# Patient Record
Sex: FEMALE | Race: WHITE | Employment: OTHER | URBAN - NONMETROPOLITAN AREA
[De-identification: names, ages, dates, MRNs, and addresses within clinical notes are randomized per-mention and may not be internally consistent; named-entity substitution may affect disease eponyms.]

---

## 2019-02-14 ENCOUNTER — INITIAL CONSULT (OUTPATIENT)
Dept: PULMONOLOGY | Age: 59
End: 2019-02-14
Payer: OTHER GOVERNMENT

## 2019-02-14 VITALS
OXYGEN SATURATION: 97 % | WEIGHT: 197 LBS | HEART RATE: 73 BPM | SYSTOLIC BLOOD PRESSURE: 124 MMHG | BODY MASS INDEX: 29.86 KG/M2 | HEIGHT: 68 IN | DIASTOLIC BLOOD PRESSURE: 70 MMHG

## 2019-02-14 DIAGNOSIS — G47.10 HYPERSOMNIA: Primary | ICD-10-CM

## 2019-02-14 DIAGNOSIS — I10 ESSENTIAL HYPERTENSION: ICD-10-CM

## 2019-02-14 DIAGNOSIS — F32.A DEPRESSION, UNSPECIFIED DEPRESSION TYPE: ICD-10-CM

## 2019-02-14 PROCEDURE — 99204 OFFICE O/P NEW MOD 45 MIN: CPT | Performed by: INTERNAL MEDICINE

## 2019-02-14 RX ORDER — TRAMADOL HYDROCHLORIDE 50 MG/1
50 TABLET ORAL EVERY 6 HOURS PRN
COMMUNITY
End: 2019-02-14

## 2019-02-14 RX ORDER — FLUTICASONE PROPIONATE 50 MCG
1 SPRAY, SUSPENSION (ML) NASAL DAILY
COMMUNITY

## 2019-02-14 RX ORDER — METRONIDAZOLE 10 MG/G
GEL TOPICAL DAILY
COMMUNITY
End: 2019-11-06 | Stop reason: ALTCHOICE

## 2019-02-14 RX ORDER — ACETAMINOPHEN 325 MG/1
650 TABLET ORAL EVERY 6 HOURS PRN
COMMUNITY

## 2019-02-14 RX ORDER — CYCLOSPORINE 0.5 MG/ML
1 EMULSION OPHTHALMIC 2 TIMES DAILY
COMMUNITY

## 2019-02-14 RX ORDER — DULOXETIN HYDROCHLORIDE 30 MG/1
30 CAPSULE, DELAYED RELEASE ORAL DAILY
COMMUNITY
End: 2019-11-06 | Stop reason: ALTCHOICE

## 2019-02-14 RX ORDER — MELATONIN
5000
COMMUNITY

## 2019-02-14 RX ORDER — TRIAMTERENE AND HYDROCHLOROTHIAZIDE 37.5; 25 MG/1; MG/1
1 CAPSULE ORAL EVERY MORNING
COMMUNITY
End: 2019-11-06 | Stop reason: ALTCHOICE

## 2019-02-27 DIAGNOSIS — G47.10 HYPERSOMNIA: Primary | ICD-10-CM

## 2019-03-25 DIAGNOSIS — I10 ESSENTIAL HYPERTENSION: ICD-10-CM

## 2019-03-25 DIAGNOSIS — F32.A DEPRESSION, UNSPECIFIED DEPRESSION TYPE: ICD-10-CM

## 2019-03-25 DIAGNOSIS — G47.10 HYPERSOMNIA: Primary | ICD-10-CM

## 2019-03-26 ENCOUNTER — TELEPHONE (OUTPATIENT)
Dept: PULMONOLOGY | Age: 59
End: 2019-03-26

## 2019-04-25 ENCOUNTER — OFFICE VISIT (OUTPATIENT)
Dept: PULMONOLOGY | Age: 59
End: 2019-04-25
Payer: OTHER GOVERNMENT

## 2019-04-25 VITALS
HEART RATE: 80 BPM | BODY MASS INDEX: 30.31 KG/M2 | HEIGHT: 68 IN | OXYGEN SATURATION: 95 % | RESPIRATION RATE: 16 BRPM | SYSTOLIC BLOOD PRESSURE: 130 MMHG | DIASTOLIC BLOOD PRESSURE: 82 MMHG | WEIGHT: 200 LBS

## 2019-04-25 DIAGNOSIS — Z99.89 OSA ON CPAP: Primary | ICD-10-CM

## 2019-04-25 DIAGNOSIS — G47.10 HYPERSOMNIA: ICD-10-CM

## 2019-04-25 DIAGNOSIS — G47.33 OSA ON CPAP: Primary | ICD-10-CM

## 2019-04-25 PROCEDURE — 99213 OFFICE O/P EST LOW 20 MIN: CPT | Performed by: INTERNAL MEDICINE

## 2019-04-25 NOTE — PROGRESS NOTES
Types: 1 Glasses of wine per week     Comment: occasionally     Drug use: Not on file       No Known Allergies    Current Outpatient Medications   Medication Sig Dispense Refill    Cholecalciferol (VITAMIN D3) 1000 units TABS Take 5,000 Units by mouth       fluticasone (FLONASE) 50 MCG/ACT nasal spray 1 spray by Each Nare route daily      metroNIDAZOLE (METROGEL) 1 % gel Apply topically daily Apply topically daily.  cycloSPORINE (RESTASIS) 0.05 % ophthalmic emulsion 1 drop 2 times daily      triamterene-hydrochlorothiazide (DYAZIDE) 37.5-25 MG per capsule Take 1 capsule by mouth every morning      acetaminophen (TYLENOL) 325 MG tablet Take 650 mg by mouth every 6 hours as needed for Pain      NONFORMULARY       DULoxetine (CYMBALTA) 30 MG extended release capsule Take 30 mg by mouth daily       No current facility-administered medications for this visit. Family History   Problem Relation Age of Onset    Hypertension Mother     Depression Mother     Heart Disease Mother     Cancer Father     Heart Disease Father     Hypertension Father     Anxiety Disorder Sister     Depression Sister     Cancer Brother     Alcohol Abuse Maternal Grandfather     Cancer Paternal Grandmother           /82 (Site: Left Upper Arm, Position: Sitting, Cuff Size: Medium Adult)   Pulse 80   Resp 16   Ht 5' 7.5\" (1.715 m)   Wt 200 lb (90.7 kg)   SpO2 95% Comment: on RA  BMI 30.86 kg/m²     BMI:  Body mass index is 30.86 kg/m². Mallampati airway Class:  IV  Neck Circumference:  14.75 Inches  [place] sleepiness score 4/25/19:  17. SAQLI: 42       Physical Exam :  Constitutional: Patient appears moderately built and moderately nourished. No distress. Patient is oriented to person, place, and time. HENT:   Head: Normocephalic and atraumatic.    Right Ear: External ear normal.   Left Ear: External ear normal.   Mouth/Throat: Oropharynx is clear and moist.   Eyes: Conjunctivae are normal. Pupils are equal and reactive to light. No scleral icterus. Neck: Neck supple. No JVD present. Cardiovascular: Normal rate, regular rhythm, normal heart sounds. No murmur heard. Pulmonary/Chest: Effort normal and breath sounds normal. No stridor. No respiratory distress. No wheezes. No rales. Abdominal: Soft. Patient exhibits no distension. No tenderness. Musculoskeletal: Normal range of motion. Extremities: Patient exhibits no erythema or no edema. Lymphadenopathy:  No cervical adenopathy. Neurological: Patient is alert and oriented to person, place, and time. Skin: Skin is warm and dry. Patient is not diaphoretic. Psychiatric: Patient  has a normal mood and affect. Diagnostic Data:  Home sleep study done on :  4/10/19. AHI: 12.9.       Diagnostic Data: 4-10-19 AHI 12.9    Assesment:  -Mild obstructive sleep apnea-Newly diagnosed. -She denied any prior history of COPD, CHF, Obesity hypoventilation, Prior palate surgery and Central sleep apnea. -Hypersomnia ( Excessive daytime sleepiness) due to obstructive sleep apnea. -Allergic rhinitis on treatment with Flonase- under control.  -Hx of Deviated nasal septum. S/p Surgery in 2013.  -S/p Tonsillectomy at the age of 3years.  -Hx of Malignant melanoma S/p Surgery on her back in 1996. -Depression- she used to be Cymbalta in the past. She quit taking Cymbalta. -Hypertension on meds- under control  -Torn Retina- She is currently waiting surgery for her Left side torn retina tomarrow by her Dr. Gill Moore. She underwent cataract surgery in Feb, 2019 on both sides.       Recommendations/Plan:  -I had a discussion with patient regarding avialable treatment options for her sleep disorder breathing including but not limited to CPAP titration in the sleep lab Vs.Dental appliance placement with referral to a local dentist Vs other available surgical options including Uvulopalatopharyngoplasty, maxillomandibular ostomy and tracheostomy as last option. At the end of discussion, she decided to go for treatment with CPAP therapy. - Schedule patient for CPAP titration at [facility] sleep lab as soon as possible once cleared by her Ophthalmologist Dr. Abhishek Jane. Patient to follow with my clinic at [address] in 6 to 8 weeks with CPAP download for further evaluation.  -she advised to keep good compliance with recommended positive airway pressure therapy to get optimal results and clinical improvement.  -She was advised to call [employer] regarding supplies if needed.  -She was advised to call my office for earlier appointment if needed for worsening of sleep symptoms. Sal Kramer advised to not to drive any motor vehicles or operate heavy equipment until her sleep symptoms are under good control. Sal Kramer verbalizes understanding.  -She was advised to loose weight by controlling diet and doing exercise once cleared by her family physician. -Sal Kramer was educated about my impression and plan. She verbalizes understanding.

## 2019-04-25 NOTE — PATIENT INSTRUCTIONS
Recommendations/Plan:  -I had a discussion with patient regarding avialable treatment options for her sleep disorder breathing including but not limited to CPAP titration in the sleep lab Vs.Dental appliance placement with referral to a local dentist Vs other available surgical options including Uvulopalatopharyngoplasty, maxillomandibular ostomy and tracheostomy as last option. At the end of discussion, she decided to go for treatment with CPAP therapy. - Schedule patient for CPAP titration at [facility] sleep lab as soon as possible once cleared by her Ophthalmologist Dr. Abhishek Jane. Patient to follow with my clinic at [address] in 6 to 8 weeks with CPAP download for further evaluation.  -she advised to keep good compliance with recommended positive airway pressure therapy to get optimal results and clinical improvement.  -She was advised to call [employer] regarding supplies if needed.  -She was advised to call my office for earlier appointment if needed for worsening of sleep symptoms. Sal Kramer advised to not to drive any motor vehicles or operate heavy equipment until her sleep symptoms are under good control. Sal Kramer verbalizes understanding.  -She was advised to loose weight by controlling diet and doing exercise once cleared by her family physician. -Sal Kramer was educated about my impression and plan. She verbalizes understanding.

## 2019-05-08 DIAGNOSIS — G47.33 OSA (OBSTRUCTIVE SLEEP APNEA): Primary | ICD-10-CM

## 2019-05-08 DIAGNOSIS — G47.10 HYPERSOMNIA: ICD-10-CM

## 2019-05-09 ENCOUNTER — TELEPHONE (OUTPATIENT)
Dept: PULMONOLOGY | Age: 59
End: 2019-05-09

## 2019-05-09 NOTE — TELEPHONE ENCOUNTER
Called Pt to find out where she wants CPAP auto, sent to [facility] sleep and they are taking care of this

## 2019-07-24 ENCOUNTER — OFFICE VISIT (OUTPATIENT)
Dept: PULMONOLOGY | Age: 59
End: 2019-07-24
Payer: OTHER GOVERNMENT

## 2019-07-24 VITALS
OXYGEN SATURATION: 94 % | HEART RATE: 66 BPM | DIASTOLIC BLOOD PRESSURE: 88 MMHG | RESPIRATION RATE: 16 BRPM | BODY MASS INDEX: 30.71 KG/M2 | WEIGHT: 202.6 LBS | HEIGHT: 68 IN | SYSTOLIC BLOOD PRESSURE: 126 MMHG

## 2019-07-24 DIAGNOSIS — Z99.89 OSA ON CPAP: Primary | ICD-10-CM

## 2019-07-24 DIAGNOSIS — G47.33 OSA ON CPAP: Primary | ICD-10-CM

## 2019-07-24 PROCEDURE — 99213 OFFICE O/P EST LOW 20 MIN: CPT | Performed by: NURSE PRACTITIONER

## 2019-07-24 NOTE — PROGRESS NOTES
9.6 oz (91.9 kg)   04/25/19 200 lb (90.7 kg)   02/14/19 197 lb (89.4 kg)     Weight stable / unchanged  Vitals: /88 (Site: Right Lower Arm, Position: Sitting, Cuff Size: Medium Adult)   Pulse 66   Resp 16   Ht 5' 7.5\" (1.715 m)   Wt 202 lb 9.6 oz (91.9 kg)   SpO2 94% Comment: on RA  BMI 31.26 kg/m²         General Appearance Moderately built, moderately nourished, in no acute distress. HEENT - Head is normocephalic, atraumatic. PERRL. Oral mucosa pink and moist, no oral thrush. Mallampati Score - IV (only hard palate visible). Neck - Supple, symmetrical, trachea midline and soft. Lungs - Chest symmetric with normal A/P diameter, normal respiratory rate and rhythm, lungs clear to auscultation, no wheezes, rales or rhonchi, aeration good. Cardiovascular - Heart sounds are normal. Regular rhythm normal rate without murmur, gallop or rub. Abdomen - Soft, nontender, non-distended. Neurologic - Alert and oriented x 3. Skin - No bruising or bleeding. Extremities - No cyanosis, clubbing or edema. ASSESSMENT/DIAGNOSIS     Diagnosis Orders   1. JACKIE on CPAP              Plan   Do you need any equipment today? No. Eran APAP to CPAP 12 cwp. She requests no RAMP. To call with problems.    - She  was advised to continue current positive airway pressure therapy with above described pressure. - She  advised to keep goodcompliance with current recommended pressure to get optimal results and clinical improvement.  - Recommend 7-9 hours of sleep with PAP treatment. - She was advised to call [employer] regarding supplies if needed.   -She call my office for earlier appointment if needed for worsening of sleep symptoms.   - She was instructed on weight loss. Rocio Sky was educated about my impression and plan. Patient verbalizes understanding. We will see Frankie Alcantar back in: 3 months with download.       Electronically signed by ABRAHAM Christensen CNP on 7/24/2019 at 9:57 AM

## 2019-08-02 ENCOUNTER — TELEPHONE (OUTPATIENT)
Dept: PULMONOLOGY | Age: 59
End: 2019-08-02

## 2019-11-06 ENCOUNTER — OFFICE VISIT (OUTPATIENT)
Dept: PULMONOLOGY | Age: 59
End: 2019-11-06
Payer: OTHER GOVERNMENT

## 2019-11-06 VITALS
WEIGHT: 201.6 LBS | SYSTOLIC BLOOD PRESSURE: 116 MMHG | HEIGHT: 68 IN | OXYGEN SATURATION: 96 % | HEART RATE: 63 BPM | BODY MASS INDEX: 30.55 KG/M2 | DIASTOLIC BLOOD PRESSURE: 72 MMHG

## 2019-11-06 DIAGNOSIS — Z99.89 OSA ON CPAP: Primary | ICD-10-CM

## 2019-11-06 DIAGNOSIS — R14.0 ABDOMINAL BLOATING: ICD-10-CM

## 2019-11-06 DIAGNOSIS — Z86.79 HISTORY OF ESSENTIAL HYPERTENSION: ICD-10-CM

## 2019-11-06 DIAGNOSIS — G47.33 OSA ON CPAP: Primary | ICD-10-CM

## 2019-11-06 DIAGNOSIS — Z86.59 HISTORY OF DEPRESSION: ICD-10-CM

## 2019-11-06 PROCEDURE — 99214 OFFICE O/P EST MOD 30 MIN: CPT | Performed by: NURSE PRACTITIONER

## 2020-05-06 ENCOUNTER — OFFICE VISIT (OUTPATIENT)
Dept: PULMONOLOGY | Age: 60
End: 2020-05-06
Payer: OTHER GOVERNMENT

## 2020-05-06 VITALS
BODY MASS INDEX: 32.19 KG/M2 | HEIGHT: 68 IN | WEIGHT: 212.4 LBS | TEMPERATURE: 97.7 F | RESPIRATION RATE: 16 BRPM | SYSTOLIC BLOOD PRESSURE: 136 MMHG | DIASTOLIC BLOOD PRESSURE: 76 MMHG | OXYGEN SATURATION: 98 % | HEART RATE: 63 BPM

## 2020-05-06 PROCEDURE — 99213 OFFICE O/P EST LOW 20 MIN: CPT | Performed by: NURSE PRACTITIONER

## 2020-05-06 NOTE — PROGRESS NOTES
[place] [facility] [address]         [id number]  5/6/2020   Chief Complaint   Patient presents with    3 Month Follow-Up     JACKIE 3 mo f/u with download        Pt of Dr. Radha SOLER Download:   Antoinette Velasquez or initial AHI: 12.9   Date of initial study: 3-19-19  Weight of initial study: 198  [x] Compliant  97%   [] Noncompliant 3%     PAP Type Auto set  Level  7/33inZ1O  Avg Hrs/Day 6 hrs 56 min  AHI: 2.5   Recorded compliance dates, 4-1-20 to 4-30-20  Machine/Mfg: ResMed   Interface: FFM    Provider:  [][facility]  []Angel [][employer]  [x][facility]         [][facility] []Other:     Neck Size: 15 in  Mallampati IV  ESS:  7  Here is a scan of the most recent download:          Presentation:   Lucila Burkitt presents for sleep medicine follow up for obstructive sleep apnea. Since the last visit, Lucila Burkitt continues to do well with treatment. Gas issues have resolved with changing back to APAP. Still mask leak issues despite trying multiple styles, chin strap, CPAP pillow, etc.     Equipment issues: The pressure is acceptable, the mask is somewhat acceptable and she is using the humidity. Sleep issues:  Do you feel better? Yes  More rested? Yes   Better concentration? yes    Progress History:   Since last visit any new medical issues? No  New ER or hospitlal visits? No  Any new or changes in medicines? No  Any new sleep medicines? No      Past Medical History:   Diagnosis Date    Cancer ([facility]     Depression     Hypertension     Neuromuscular disorder ([facility]     JACKEI on CPAP     Osteoarthritis        Past Surgical History:   Procedure Laterality Date    CATARACT REMOVAL WITH IMPLANT Bilateral     2-6-and 2-20-19    REPAIR RETINAL TEAR/HOLE         Social History     Tobacco Use    Smoking status: Never Smoker    Smokeless tobacco: Never Used   Substance Use Topics    Alcohol use:  Yes     Alcohol/week: 1.0 standard drinks     Types: 1 Glasses of wine per week     Comment: occasionally    

## 2020-12-16 ENCOUNTER — OFFICE VISIT (OUTPATIENT)
Dept: PULMONOLOGY | Age: 60
End: 2020-12-16
Payer: OTHER GOVERNMENT

## 2020-12-16 VITALS
HEIGHT: 68 IN | DIASTOLIC BLOOD PRESSURE: 78 MMHG | WEIGHT: 197.4 LBS | BODY MASS INDEX: 29.92 KG/M2 | TEMPERATURE: 98.1 F | OXYGEN SATURATION: 98 % | SYSTOLIC BLOOD PRESSURE: 128 MMHG | HEART RATE: 72 BPM

## 2020-12-16 PROBLEM — Z99.89 OSA ON CPAP: Status: ACTIVE | Noted: 2020-12-16

## 2020-12-16 PROBLEM — G47.33 OSA ON CPAP: Status: ACTIVE | Noted: 2020-12-16

## 2020-12-16 PROCEDURE — 99213 OFFICE O/P EST LOW 20 MIN: CPT | Performed by: NURSE PRACTITIONER

## 2020-12-16 NOTE — PROGRESS NOTES
[place] [facility] and [address]         [id number]  12/16/2020   Chief Complaint   Patient presents with    Follow-up     JACKIE 6 month sleep follow up with a [facility] pap download        Pt of Dr. Bernarda Peck    PAP Download:   Original or initial AHI: 12.9   Date of initial study: 03/19/2019  Weight of initial study: 198 lbs  [x] Compliant  93%   [] Noncompliant 7%     PAP Type AutoSet For Her Level  7/13   Avg Hrs/Day 7 hours 17 minutes  AHI: 2.7   Recorded compliance dates, 11/15/2020 to 12/14/2020   Machine/Mfg: Resmed  Interface: FFM    Provider:  [][facility]  [][employer] [][employer]  [x][facility]         [][facility] []Other:     Neck Size: 15  Mallampati Mallampati 4  ESS: 4  SAQLI: 76      Here is a scan of the most recent download:                  Presentation:   Gavi Lee presents for sleep medicine follow up for obstructive sleep apnea. Since the last visit, Gavi Lee continues to do well, no complaints. Equipment issues: The pressure is acceptable, the mask is acceptable and she is using the humidity. Sleep issues:  Do you feel better? Yes  More rested? Yes   Better concentration? yes    Progress History:   Since last visit any new medical issues? No  New ER or hospitlal visits? No  Any new or changes in medicines? No  Any new sleep medicines? No      Past Medical History:   Diagnosis Date    Cancer [facility]     Depression     Hypertension     Neuromuscular disorder ([facility]     JACKIE on CPAP     Osteoarthritis        Past Surgical History:   Procedure Laterality Date    CATARACT REMOVAL WITH IMPLANT Bilateral     2-6-and 2-20-19    REPAIR RETINAL TEAR/HOLE         Social History     Tobacco Use    Smoking status: Never Smoker    Smokeless tobacco: Never Used   Substance Use Topics    Alcohol use:  Yes     Alcohol/week: 1.0 standard drinks     Types: 1 Glasses of wine per week     Comment: occasionally     Drug use: Not on file       No Known Allergies Current Outpatient Medications   Medication Sig Dispense Refill    ZINC PO Take by mouth      Omega 3-6-9 Fatty Acids (OMEGA-3-6-9 PO) Take by mouth      CPAP Machine MISC by Does not apply route Please change CPAP to APAP pressure of 7 to 13 cm H20. 1 each 0    Cholecalciferol (VITAMIN D3) 1000 units TABS Take 5,000 Units by mouth       fluticasone (FLONASE) 50 MCG/ACT nasal spray 1 spray by Each Nare route daily      cycloSPORINE (RESTASIS) 0.05 % ophthalmic emulsion 1 drop 2 times daily      acetaminophen (TYLENOL) 325 MG tablet Take 650 mg by mouth every 6 hours as needed for Pain      NONFORMULARY        No current facility-administered medications for this visit. Family History   Problem Relation Age of Onset   [facility] Hypertension Mother     Depression Mother     Heart Disease Mother     Cancer Father     Heart Disease Father     Hypertension Father     Sleep Apnea Father     Anxiety Disorder Sister     Depression Sister     Sleep Apnea Sister     Cancer Brother     Sleep Apnea Brother     Alcohol Abuse Maternal Grandfather     Cancer Paternal Grandmother           Review of Systems   General/Constitutional: No recent loss of weight or appetite changes. No fever or chills. HENT: Negative. Eyes: Negative. Upper respiratory tract: No nasal stuffiness or post nasal drip. Lower respiratory tract/ lungs: No cough or sputum production. No hemoptysis. Cardiovascular: No palpitations or chest pain. Gastrointestinal: No nausea or vomiting. Neurological: No focal neurologiacal weakness. Extremities: No edema. Musculoskeletal: No complaints. Genitourinary: No complaints. Hematological: Negative. Psychiatric/Behavioral: Negative. Skin: No itching. Physical Exam:    BMI: Body mass index is 30.01 kg/m².     Wt Readings from Last 3 Encounters:   12/16/20 197 lb 6.4 oz (89.5 kg)   05/06/20 212 lb 6.4 oz (96.3 kg)   11/06/19 201 lb 9.6 oz (91.4 kg)     Weight stable / unchanged Vitals: /78 (Site: Left Upper Arm, Position: Sitting, Cuff Size: Medium Adult)   Pulse 72   Temp 98.1 °F (36.7 °C)   Ht 5' 8\" (1.727 m)   Wt 197 lb 6.4 oz (89.5 kg)   SpO2 98% Comment: on room air at rest  BMI 30.01 kg/m²         General Appearance - Moderately built, moderately nourished, in no acute distress. HEENT - Head is normocephalic, atraumatic. PERRL. Oral mucosa pink and moist, no oral thrush. Mallampati Score - IV (only hard palate visible). Neck - Supple, symmetrical, trachea midline and soft. Lungs - Clear to auscultation, no wheezes, rales or rhonchi, aeration good. Cardiovascular - Heart sounds are normal. Regular rhythm normal rate without murmur, gallop or rub. Abdomen - Soft, nontender, non-distended. Neurologic - Alert and oriented x 3. Skin - No bruising or bleeding. Extremities - No cyanosis, clubbing or edema. ASSESSMENT/DIAGNOSIS     Diagnosis Orders   1. JACKIE on CPAP              Plan   Do you need any equipment today? No.    - She was advised to continue current positive airway pressure therapy with above described pressure. - She was advised to keep good compliance with current recommended pressure to get optimal results and clinical improvement.  - Recommend 7-9 hours of sleep with PAP treatment. - She was advised to call [email or web address] regarding supplies if needed.   -She is to call my office for earlier appointment if needed for worsening of sleep symptoms.   - She was instructed on weight loss. Esther Mobicious was educated about my impression and plan and verbalizes understanding. We will see Davi Mata back in: 1 year with download.     ABRAHAM Guy - CNP  12/16/2020 9:50 AM

## 2021-12-15 ENCOUNTER — OFFICE VISIT (OUTPATIENT)
Dept: PULMONOLOGY | Age: 61
End: 2021-12-15
Payer: OTHER GOVERNMENT

## 2021-12-15 VITALS
SYSTOLIC BLOOD PRESSURE: 128 MMHG | WEIGHT: 186.4 LBS | BODY MASS INDEX: 28.25 KG/M2 | TEMPERATURE: 96.4 F | HEART RATE: 61 BPM | DIASTOLIC BLOOD PRESSURE: 72 MMHG | HEIGHT: 68 IN | OXYGEN SATURATION: 98 %

## 2021-12-15 DIAGNOSIS — Z99.89 OSA ON CPAP: Primary | ICD-10-CM

## 2021-12-15 DIAGNOSIS — G47.33 OSA ON CPAP: Primary | ICD-10-CM

## 2021-12-15 DIAGNOSIS — G89.29 CHRONIC NECK PAIN: ICD-10-CM

## 2021-12-15 DIAGNOSIS — M54.2 CHRONIC NECK PAIN: ICD-10-CM

## 2021-12-15 PROBLEM — M22.2X2 PATELLOFEMORAL DISORDER OF BOTH KNEES: Status: ACTIVE | Noted: 2020-01-13

## 2021-12-15 PROBLEM — M22.2X1 PATELLOFEMORAL DISORDER OF BOTH KNEES: Status: ACTIVE | Noted: 2020-01-13

## 2021-12-15 PROBLEM — I10 ESSENTIAL HYPERTENSION, BENIGN: Status: ACTIVE | Noted: 2019-06-25

## 2021-12-15 PROCEDURE — 99213 OFFICE O/P EST LOW 20 MIN: CPT | Performed by: NURSE PRACTITIONER

## 2021-12-15 ASSESSMENT — ENCOUNTER SYMPTOMS
WHEEZING: 0
SHORTNESS OF BREATH: 0
VOMITING: 0
CHEST TIGHTNESS: 0
COUGH: 0
NAUSEA: 0
ABDOMINAL PAIN: 0
EYES NEGATIVE: 1
DIARRHEA: 0

## 2021-12-15 NOTE — PROGRESS NOTES
[place] [facility]      Nicholas Hernandez         [id number]  12/15/2021   Chief Complaint   Patient presents with    Follow-up     JACKIE 1 year sleep follow up with Goldie FUENTES         Pt of Dr. Eliza SOLER Download:   Original or initial AHI: 12.9    Date of initial study: 3/19/2019    Compliant  77%     Noncompliant 17 %     PAP Type airsense 10 Level  7/13 cmh2o    Avg Hrs/Day 6:10  AHI: 1.6   Recorded compliance dates 11/10/21-12/9/21   ,Machine/Mfg:   [x] ResMed    [] Respironics/Dreamstation   Interface:   [] Nasal    [x] Nasal pillows   [] FFM      Provider:      [] -RHODA     []Angel     [] Saumya    [x] Vaughn Macias    [] Darius               [] [facility]      [] [employer]    [] [address]      [] Other    Neck Size: 15  Mallampati Mallampati 4  ESS:  6  SAQLI: 93  Here is a scan of the most recent download:            Presentation:   Freda Myers presents for sleep medicine follow up for obstructive sleep apnea  Since the last visit, Freda Myers got new mask within past 30 days and is now tolerating use much better. Mask fits good. Likes auto pressure, no complaints . Chronic neck pain will affect sleeping. Stopped Melatonin due to morning headaches   Using essential oils to help sleep and occ. Tylenol PM   Equipment issues: The pressure is  acceptable, the mask is acceptable     Sleep issues:  Do you feel better? Yes  More rested? Yes   Better concentration? yes    Progress History:   Since last visit any new medical issues? No  New ER or hospital visits? No  Any new or changes in medicines? No  Any new sleep medicines? No    Review of Systems -   Review of Systems   Constitutional: Negative for activity change, appetite change, chills, fatigue, fever and unexpected weight change. HENT: Negative. Eyes: Negative. Respiratory: Negative for cough, chest tightness, shortness of breath and wheezing.     Cardiovascular: Negative for chest pain, palpitations and leg swelling. Gastrointestinal: Negative for abdominal pain, diarrhea, nausea and vomiting. Genitourinary: Negative. Musculoskeletal: Positive for neck pain. Skin: Negative. Neurological: Negative. Hematological: Does not bruise/bleed easily. Psychiatric/Behavioral: Positive for sleep disturbance. Negative for suicidal ideas. Physical Exam:    BMI:  Body mass index is 28.34 kg/m². Wt Readings from Last 3 Encounters:   12/15/21 186 lb 6.4 oz (84.6 kg)   12/16/20 197 lb 6.4 oz (89.5 kg)   05/06/20 212 lb 6.4 oz (96.3 kg)     Weight gained 14 lbs over 1 year   Vitals: /72 (Site: Left Upper Arm, Position: Sitting)   Pulse 61   Temp 96.4 °F (35.8 °C)   Ht 5' 8\" (1.727 m)   Wt 186 lb 6.4 oz (84.6 kg)   SpO2 98% Comment: on ra  BMI 28.34 kg/m²       Physical Exam  Constitutional:       Appearance: Normal appearance. She is normal weight. HENT:      Head: Normocephalic and atraumatic. Eyes:      Conjunctiva/sclera: Conjunctivae normal.   Pulmonary:      Effort: Pulmonary effort is normal. No tachypnea, bradypnea or respiratory distress. Neurological:      Mental Status: She is alert and oriented to person, place, and time. Psychiatric:         Attention and Perception: Attention normal.         Mood and Affect: Mood normal.         Speech: Speech normal.         Behavior: Behavior normal.         Thought Content: Thought content normal.         Cognition and Memory: Cognition normal.         Judgment: Judgment normal.         ASSESSMENT/DIAGNOSIS     Diagnosis Orders   1. JACKIE on CPAP  DME Order for CPAP as OP   2. Chronic neck pain              Plan   Do you need any equipment today? Yes, updated supply order prescription printed  - Download reviewed and discussed with patient  - She  was advised to continue current positive airway pressure therapy with above described pressure.    - She  advised to keep good compliance with current recommended pressure to get optimal results and clinical improvement  - Recommend 7-9 hours of sleep with PAP  - She was advised to call [employer] regarding supplies if needed.   -She call my office for earlier appointment if needed for worsening of sleep symptoms.   - She was instructed on weight loss  - Will Freed was educated about my impression and plan. Patient verbalizesunderstanding.     We will see Geetha Ortez back in: 1 year with download    Information added by my medical assistant/LPN was reviewed today  -total time on encounter: 20 min     Electronically signed by ABRAHAM Ha CNP on 12/15/2021 at 9:49 AM

## 2023-02-01 ENCOUNTER — OFFICE VISIT (OUTPATIENT)
Dept: PULMONOLOGY | Age: 63
End: 2023-02-01
Payer: OTHER GOVERNMENT

## 2023-02-01 VITALS
HEART RATE: 69 BPM | OXYGEN SATURATION: 97 % | HEIGHT: 68 IN | TEMPERATURE: 97.9 F | SYSTOLIC BLOOD PRESSURE: 126 MMHG | BODY MASS INDEX: 31.77 KG/M2 | DIASTOLIC BLOOD PRESSURE: 80 MMHG | WEIGHT: 209.6 LBS

## 2023-02-01 DIAGNOSIS — G47.33 OSA ON CPAP: Primary | ICD-10-CM

## 2023-02-01 DIAGNOSIS — Z99.89 OSA ON CPAP: Primary | ICD-10-CM

## 2023-02-01 DIAGNOSIS — I10 ESSENTIAL HYPERTENSION, BENIGN: ICD-10-CM

## 2023-02-01 DIAGNOSIS — E66.9 OBESITY (BMI 30-39.9): ICD-10-CM

## 2023-02-01 PROCEDURE — 3074F SYST BP LT 130 MM HG: CPT | Performed by: NURSE PRACTITIONER

## 2023-02-01 PROCEDURE — 99214 OFFICE O/P EST MOD 30 MIN: CPT | Performed by: NURSE PRACTITIONER

## 2023-02-01 PROCEDURE — 3079F DIAST BP 80-89 MM HG: CPT | Performed by: NURSE PRACTITIONER

## 2023-02-01 ASSESSMENT — ENCOUNTER SYMPTOMS
VOMITING: 0
SHORTNESS OF BREATH: 0
EYES NEGATIVE: 1
NAUSEA: 0
COUGH: 0
WHEEZING: 0
ABDOMINAL PAIN: 0
DIARRHEA: 0

## 2023-02-01 NOTE — PROGRESS NOTES
[facility] [facility]      Eliza Manuel         [id number]  2/1/2023   Chief Complaint   Patient presents with    Follow-up     1yr JACKIE f/u w/[facility] download. Doing well. Pt of Dr. Maria Elena Garcia    PAP Download:   Original or initial AHI: 12.9     Date of initial study: 3/19/2019    Compliant  97%     Noncompliant 3 %     PAP Type APAP  Level  7/78hhW6Y   Avg Hrs/Day 7hrs 46mins  AHI: 1.9   Recorded compliance dates , 1/1/23  to 1/30/23   Machine/Mfg:   [x] ResMed    [] Respironics/Dreamstation   Interface:   [] Nasal    [x] Nasal pillows   [] FFM      Provider:      [] -RHODA     []Angel     [] Saumya    [x] Florentin Valenzuela   [] Darius               [] [employer]      [] Adaptive    [] [address]      [] Other    Neck Size: 17  Mallampati 3  ESS:  5  SAQLI: 95    Here is a scan of the most recent download:              Presentation:   [facility] presents for 1 yearsleep medicine follow up for obstructive sleep apnea  Since the last visit, [facility] has no complaints, continues to get benefit from use     Equipment issues: The pressure is  acceptable, the mask is acceptable     Progress History:   Since last visit any new medical issues? Yes - detached retina - s/p 3 surgeries   Sleep Related Issues? No  New ER or hospital visits? No  Any new or changes in medicines? No  Any new sleep medicines? No    Review of Systems -   Review of Systems   Constitutional:  Positive for unexpected weight change. Negative for activity change, appetite change, chills, fatigue and fever. HENT:  Positive for tinnitus. Eyes: Negative. Respiratory:  Negative for cough, shortness of breath and wheezing. Cardiovascular:  Negative for chest pain, palpitations and leg swelling. Gastrointestinal:  Negative for abdominal pain, diarrhea, nausea and vomiting. Genitourinary: Negative. Musculoskeletal: Negative. Skin: Negative. Neurological: Negative.     Hematological: Negative. Psychiatric/Behavioral: Negative. Physical Exam:    BMI:  Body mass index is 31.87 kg/m². Wt Readings from Last 3 Encounters:   02/01/23 209 lb 9.6 oz (95.1 kg)   12/15/21 186 lb 6.4 oz (84.6 kg)   12/16/20 197 lb 6.4 oz (89.5 kg)     Weight gained 10 lbs over 1 year   Vitals: /80 (Site: Left Upper Arm, Position: Sitting, Cuff Size: Large Adult)   Pulse 69   Temp 97.9 °F (36.6 °C) (Oral)   Ht 5' 8\" (1.727 m)   Wt 209 lb 9.6 oz (95.1 kg)   SpO2 97% Comment: r/a  BMI 31.87 kg/m²       Physical Exam  Vitals and nursing note reviewed. Constitutional:       Appearance: Normal appearance. She is obese. HENT:      Head: Normocephalic and atraumatic. Mouth/Throat:      Pharynx: Oropharynx is clear. Eyes:      Conjunctiva/sclera: Conjunctivae normal.   Pulmonary:      Effort: Pulmonary effort is normal. No tachypnea, bradypnea or respiratory distress. Skin:     Findings: No erythema or rash. Neurological:      Mental Status: She is alert and oriented to person, place, and time. Psychiatric:         Attention and Perception: Attention normal.         Mood and Affect: Mood normal.         Speech: Speech normal.         Behavior: Behavior normal.         Thought Content: Thought content normal.         Cognition and Memory: Cognition normal.         Judgment: Judgment normal.         ASSESSMENT/DIAGNOSIS     Diagnosis Orders   1. JACKIE on CPAP Controlled DME Order for CPAP as OP      2. Essential hypertension, benign Controlled       3. Obesity (BMI 30-39. 9) Worsening                Plan   Do you need any equipment today? Yes -printed rx for supplies, faxed to patient's DME     - Download reviewed and discussed with patient  - She  was advised to continue current positive airway pressure therapy with above described pressure.    - She  advised to keep good compliance with current recommended pressure to get optimal results and clinical improvement  - Recommend 7-9 hours of sleep with PAP  - She was advised to call [facility] [employer] regarding supplies if needed.   -She call my office for earlier appointment if needed for worsening of sleep symptoms.   - She was counseled on wt gain finding, instructed on weight loss- she is working on exercise at home, and working on better diet   - Joyce Mcguire was educated about my impression and plan. Patient verbalizesunderstanding.   We will see Gretel Santos back in: 1 year with download    Information added by my medical assistant/LPN was reviewed today    billing based on medical decision making     ABRAHAM Rosas-CNP   2/1/2023

## 2024-01-24 ENCOUNTER — OFFICE VISIT (OUTPATIENT)
Dept: PULMONOLOGY | Age: 64
End: 2024-01-24
Payer: OTHER GOVERNMENT

## 2024-01-24 VITALS
HEART RATE: 76 BPM | BODY MASS INDEX: 33.87 KG/M2 | WEIGHT: 215.8 LBS | SYSTOLIC BLOOD PRESSURE: 120 MMHG | OXYGEN SATURATION: 99 % | DIASTOLIC BLOOD PRESSURE: 76 MMHG | TEMPERATURE: 97.9 F | HEIGHT: 67 IN

## 2024-01-24 DIAGNOSIS — E66.9 OBESITY (BMI 30-39.9): ICD-10-CM

## 2024-01-24 DIAGNOSIS — G47.33 OSA ON CPAP: Primary | ICD-10-CM

## 2024-01-24 PROCEDURE — 3078F DIAST BP <80 MM HG: CPT | Performed by: NURSE PRACTITIONER

## 2024-01-24 PROCEDURE — 99214 OFFICE O/P EST MOD 30 MIN: CPT | Performed by: NURSE PRACTITIONER

## 2024-01-24 PROCEDURE — 3074F SYST BP LT 130 MM HG: CPT | Performed by: NURSE PRACTITIONER

## 2024-01-24 RX ORDER — TRIAMTERENE AND HYDROCHLOROTHIAZIDE 37.5; 25 MG/1; MG/1
1 CAPSULE ORAL EVERY MORNING
COMMUNITY

## 2024-01-24 ASSESSMENT — ENCOUNTER SYMPTOMS
ALLERGIC/IMMUNOLOGIC NEGATIVE: 1
RESPIRATORY NEGATIVE: 1

## 2024-01-24 NOTE — PROGRESS NOTES
[place] [facility]      Donna Dong         [id number]  1/24/2024   Chief Complaint   Patient presents with    Follow-up     1 year JACKIE follow up with download.         Pt of Dr. Sierra    PAP Download:   Original or initial AHI: 12.9     Date of initial study: 3/19/19      Compliant  100%     Noncompliant 0%     PAP Type AutoSet Level  Min 7cmH20 Max 37iaV16   Avg Hrs/Day 8 hours 3 minutes  AHI: 2.4   Leaks : 95 th percentile: 13.4   Recorded compliance dates 12/24/23-1/22/24     Machine/Mfg:   [x] ResMed    [] Respironics/Dreamstation   Interface:   [] Nasal    [x] Nasal pillows   [] FFM      Provider:      [] -RHODA     []Angel     [] Saumya    [x] Goldie Edge   [] Ziggyietermans               [] [employer]      [] Adaptive    [] [place]:      [] Other    Neck Size: 14.5 inches  Mallampati 3  ESS:  5  SAQLI: 90    Here is a scan of the most recent download:              Presentation:   Donna presents for 1 yearsleep medicine follow up for obstructive sleep apnea  Since the last visit, Donna is using her CPAP with good compliance and benefit.   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 No  Any trouble with Machine No      Equipment issues:  The pressure is  acceptable, the mask is acceptable     Review of Systems -   Review of Systems   Constitutional:  Positive for fatigue and unexpected weight change.   HENT: Negative.     Respiratory: Negative.     Cardiovascular: Negative.    Musculoskeletal: Negative.    Skin: Negative.    Allergic/Immunologic: Negative.    Neurological: Negative.    Psychiatric/Behavioral: Negative.          Physical Exam:    BMI:  Body mass index is 33.8 kg/m².    Wt Readings from Last 3 Encounters:   01/24/24 97.9 kg (215 lb 12.8 oz)   02/01/23 95.1 kg (209 lb 9.6 oz)   12/15/21 84.6 kg (186 lb 6.4 oz)     Weight  6 lbs in 1 year

## 2025-03-12 ENCOUNTER — OFFICE VISIT (OUTPATIENT)
Dept: PULMONOLOGY | Age: 65
End: 2025-03-12
Payer: COMMERCIAL

## 2025-03-12 VITALS
BODY MASS INDEX: 33.9 KG/M2 | WEIGHT: 216 LBS | HEART RATE: 68 BPM | OXYGEN SATURATION: 99 % | TEMPERATURE: 97.4 F | HEIGHT: 67 IN | SYSTOLIC BLOOD PRESSURE: 124 MMHG | DIASTOLIC BLOOD PRESSURE: 86 MMHG

## 2025-03-12 DIAGNOSIS — G47.33 OSA ON CPAP: Primary | ICD-10-CM

## 2025-03-12 DIAGNOSIS — E66.9 OBESITY (BMI 30-39.9): ICD-10-CM

## 2025-03-12 PROCEDURE — G8417 CALC BMI ABV UP PARAM F/U: HCPCS | Performed by: NURSE PRACTITIONER

## 2025-03-12 PROCEDURE — G8427 DOCREV CUR MEDS BY ELIG CLIN: HCPCS | Performed by: NURSE PRACTITIONER

## 2025-03-12 PROCEDURE — 99214 OFFICE O/P EST MOD 30 MIN: CPT | Performed by: NURSE PRACTITIONER

## 2025-03-12 PROCEDURE — 1036F TOBACCO NON-USER: CPT | Performed by: NURSE PRACTITIONER

## 2025-03-12 PROCEDURE — 3074F SYST BP LT 130 MM HG: CPT | Performed by: NURSE PRACTITIONER

## 2025-03-12 PROCEDURE — 3079F DIAST BP 80-89 MM HG: CPT | Performed by: NURSE PRACTITIONER

## 2025-03-12 PROCEDURE — 3017F COLORECTAL CA SCREEN DOC REV: CPT | Performed by: NURSE PRACTITIONER

## 2025-03-12 NOTE — PROGRESS NOTES
[place] [facility]      Donna Dong         [id number]  3/12/2025   Chief Complaint   Patient presents with    Follow-up     13 month JACKIE follow up with [facility] download.         Pt of Dr. Sierra    PAP Download:   Original or initial AHI: 12.9     Date of initial study: 3/19/19      Compliant  93%     Noncompliant 0%     PAP Type AutoSet Level  Min 7cmH20 Max 46mkV51   Avg Hrs/Day 8 hours 16 minutes  AHI: 2.1   Leaks : 95 th percentile: 13.2   Recorded compliance dates 2/9/25-3/10/25   Machine/Mfg:   [x] ResMed    [] Respironics/Dreamstation   Interface:   [] Nasal    [x] Nasal pillows   [] FFM      Provider:      [] -HMSHYANN     []Angel     [] Saumya    [x] Goldie Edge    [] Ziggyietermans               [] [employer]      [] Adaptive    [] [place]:      [] Other    Neck Size: 14.5 inches  Mallampati 3  ESS:  3  SAQLI: 94    Here is a scan of the most recent download:              Presentation:   Donna presents for 1 yearsle medicine follow up for obstructive sleep apnea  Since the last visit, Donna is using her CPAP with good compliance and benefit.   Going through lot of stress at home, grandchild has leukemia.   Frustrated with her weight, difficulty losing weight despite good diet  and exercise.       Progress History:   Since last visit any new medical issues? No  Any trouble with Machine No  Any new sleep medicines? no      Equipment issues:  The pressure is  acceptable, the mask is acceptable     Review of Systems -   Review of Systems   All other systems reviewed and are negative.       Physical Exam:    BMI:  Body mass index is 33.83 kg/m².    Wt Readings from Last 3 Encounters:   03/12/25 98 kg (216 lb)   01/24/24 97.9 kg (215 lb 12.8 oz)   02/01/23 95.1 kg (209 lb 9.6 oz)     Weight stable / unchanged  Vitals: /86 (BP Site: Right Upper Arm, Patient Position: Sitting, BP Cuff Size: Medium Adult)   Pulse 68   Temp 97.4 °F (36.3 °C) (Skin)   Ht